# Patient Record
(demographics unavailable — no encounter records)

---

## 2025-04-01 NOTE — ASSESSMENT
[FreeTextEntry1] : I reviewed the TTE and discussed the case with patient. Will obtain JACKIE, left and right heart cath as well as CTA chest to evaluate pulmonary veins.  Once tests are completed, will evaluate with Dr. Terence Lovelace for candidacy for transcatheter ASD repair.

## 2025-04-01 NOTE — HISTORY OF PRESENT ILLNESS
[FreeTextEntry1] : 50 year old male followed and referred by Dr. Kamaljit Gabriel for evaluation of large secundum ASD 20mm in diameter.  PMH includes HLD, recently seen by Dr. Gabriel after she was noted to have cardiac murmur and carotid bruit by her PCP.  She presents today with [facility] , reports she lived in [place] most of her life and [place] and was told she had a murmur on her physical exam. She reports she never had any symptoms but recently has noticed some palpitations at times where her heart beats fast and then goes back to normal.  This happens only when she lays on her side at night. 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 Had surgery for uterine fibroids in past.  Does not take any medications. Has a daily glass of wine at night.  Denies chest pain, SOB, swelling, weight gain, cough, fever or chills.  TTE 1/9/2025: Mild-mod TR, Mild HI, RV mod enlarged, ASD shunts from right to left.

## 2025-04-01 NOTE — HISTORY OF PRESENT ILLNESS
[FreeTextEntry1] : 50 year old male followed and referred by Dr. Kamaljit Gabriel for evaluation of large secundum ASD 20mm in diameter.  PMH includes HLD, recently seen by Dr. Gabriel after she was noted to have cardiac murmur and carotid bruit by her PCP.  She presents today with [facility] , reports she lived in [place] most of her life [place] and was told she had a murmur on her physical exam. She reports she never had any symptoms but recently has noticed some palpitations at times where her heart beats fast and then goes back to normal.  This happens only when she lays on her side at night. 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 Had surgery for uterine fibroids in past.  Does not take any medications. Has a daily glass of wine at night.  Denies chest pain, SOB, swelling, weight gain, cough, fever or chills.  TTE 1/9/2025: Mild-mod TR, Mild MN, RV mod enlarged, ASD shunts from right to left.

## 2025-04-01 NOTE — END OF VISIT
[FreeTextEntry3] : Written by Bob Nicole NP, acting as a scribe for Dr. Chairez The documentation recorded by the scribe accurately reflects the service I personally performed and the decisions made by me. Signature Jeison Chairez MD.

## 2025-04-01 NOTE — PHYSICAL EXAM
[General Appearance - Alert] : alert [General Appearance - In No Acute Distress] : in no acute distress [General Appearance - Well Nourished] : well nourished [General Appearance - Well Developed] : well developed [Sclera] : the sclera and conjunctiva were normal [] : no respiratory distress [Respiration, Rhythm And Depth] : normal respiratory rhythm and effort [Exaggerated Use Of Accessory Muscles For Inspiration] : no accessory muscle use [Auscultation Breath Sounds / Voice Sounds] : lungs were clear to auscultation bilaterally [Apical Impulse] : the apical impulse was normal [Heart Rate And Rhythm] : heart rate was normal and rhythm regular [Heart Sounds] : normal S1 and S2 [Abdomen Soft] : soft [Abdomen Tenderness] : non-tender [Involuntary Movements] : no involuntary movements were seen [No Focal Deficits] : no focal deficits [Oriented To Time, Place, And Person] : oriented to person, place, and time [Impaired Insight] : insight and judgment were intact [Affect] : the affect was normal [Mood] : the mood was normal

## 2025-04-01 NOTE — REASON FOR VISIT
[Consultation] : a consultation visit [Language Line ] : provided by Language [employer]   [Interpreters_IDNumber] : [id number] [Interpreters_FullName] : Archana [TWNoteComboBox1] : Chinese

## 2025-04-01 NOTE — REASON FOR VISIT
[Consultation] : a consultation visit [Language Line ] : provided by [employer]   [Interpreters_IDNumber] : [id number] [Interpreters_FullName] : Archana [TWNoteComboBox1] : Chinese

## 2025-05-08 NOTE — ADDENDUM
[FreeTextEntry1] : Mr. Gabriel has an ASD with significant shunting and minimal rim available for transcatheter closure. She has RV enlargement and exertional symptoms.  PA pressures and CI are thankfully preserved.  I have explained the indications for ASD closure and possible tricuspid repair. I plan to do this with a minimally invasive approach. She opts to have this done at [facility] in [place] and I will facilitate these arrangements.

## 2025-05-08 NOTE — DATA REVIEWED
[FreeTextEntry1] : I have independently reviewed the following: Cardiac catheterization on 4/10/2025: 1. 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 No angiographically apparent atherosclerotic coronary artery disease 3. Normal LVEF (>55%) 4. Normal LVEDP (5mmHg) 5. No transaortic valvular gradient 6. Right heart catheterization conclusions:      - Normal right atrial pressure (RA 8/7 (3) mmHg)      - Moderately elevated right heart filling pressure (RV 45/0/11 mmHg, PAP 42/10 (22) mmHg)      - Normal PCWP (8/8 (4) mmHg)      - Normal cardiac output and index, Rebecca CO 4.55L/min, Cl 3.15 L/min/sq m      - Room air RA sat 93%, PA sat 85%  CT Angio Heart Coronary w/IV cont on 4/25/2025: 1.  Secundum-type atrioseptal defect. 2.  No anomalous pulmonary venous return. 3.  Enlarged right atrium and right ventricle with straightening of the interventricular septum can be secondary to increased right ventricular pressure and/or volume.

## 2025-06-19 NOTE — REASON FOR VISIT
[Follow-Up: _____] : a [unfilled] follow-up visit [FreeTextEntry1] : FOLLOW YOUR HEART- Transitional [facility]- [facility]

## 2025-06-19 NOTE — PHYSICAL EXAM
[Sclera] : the sclera and conjunctiva were normal [Neck Appearance] : the appearance of the neck was normal [] : no respiratory distress [Respiration, Rhythm And Depth] : normal respiratory rhythm and effort [Exaggerated Use Of Accessory Muscles For Inspiration] : no accessory muscle use [Auscultation Breath Sounds / Voice Sounds] : lungs were clear to auscultation bilaterally [Apical Impulse] : the apical impulse was normal [Heart Rate And Rhythm] : heart rate was normal and rhythm regular [Heart Sounds] : normal S1 and S2 [Murmurs] : no murmurs [Chest Visual Inspection Thoracic Asymmetry] : no chest asymmetry [1+] : left 1+ [FreeTextEntry2] : B/L LE without edema, B/L calves soft, NT  [Abnormal Walk] : normal gait [Skin Color & Pigmentation] : normal skin color and pigmentation [Skin Turgor] : normal skin turgor [Skin Lesions] : no skin lesions [FreeTextEntry1] : at baseline  [Oriented To Time, Place, And Person] : oriented to person, place, and time [Impaired Insight] : insight and judgment were intact [Affect] : the affect was normal

## 2025-06-19 NOTE — HISTORY OF PRESENT ILLNESS
[FreeTextEntry1] : 51 yo F with PMHx HLD who was found to have an incidental murmur during annual work-up with her PCP. Further TTE showed a a large secundum ASD 20mm in diameter. Patient presented to [facility] 6/10/25 SDA underwent a mini ASD closure, TV repair with 30 mm band. No intraoperative complications or arrhythmia. Arrived to CTICU extubated on HFNC. POD 1, albumin given for low urine output with positive response. Weaned off HFNC. Transferred to the floor. Medrol dose pack started. POD 2, pleural tube removed with stable post-pull CXR. PW removed without incidence. Aldactone started. POD 3, pericardial drain removed. 3 mg Warfarin started. Post-op TTE with good valve function and proper ring placement. POD 4. Started BB. POD 5, INR is 3.0, coumadin is held. POD 6, INR 2.29. Pt remained hemodynamically stable and discharged home with support of spouse/family, home care services and the [facility] team. Initial visit completed in home. Language Line  utilized for duration of visit #[id number] CC "I'm doing ok"

## 2025-06-19 NOTE — ASSESSMENT
[FreeTextEntry1] : Pt recovering well at home s/p OHS. Reviewed all medications and dosages with pt understanding. Pt has all medications in home and is taking as prescribed. Pain controlled with current medication regimen. No new symptoms, issues or concerns to report at this time.  PLAN:  -Continue current medication regimen   -Continue Post Operative Care including:      -Cleanse all incisions DAILY with mild soap and water. Avoid lotion, powders and/or creams near or on incisions       -Daily weights- report any increase of 2 lbs or more overnight to the [facility] or CTS team       -Incentive spirometry with cough and deep breathing several times per hour      -Ambulate as much as tolerated including outdoors if weather and safety permits      -Avoid lifting anything more than 5 lbs and avoid straining      -Maintain a low sodium, low fat, heart healthy diet, including healthy sources of protein   [facility] team will continue to follow up with pt status. NP/CCC roles explained with pt understanding, contact information provided. Pt agrees to call with any questions, issues or concerns.  Worsening symptoms reviewed with patient understanding.    FOLLOW UP APPOINTMENTS: CTS: Dr. Chairez [place] office 6/24 CARDIOLOGIST: Dr. Gabriel later today at 3 PM for INR check. Rev'd importance of INR monitoring with pt understanding PCP: Pt encouraged to follow up within one month of discharge

## 2025-06-24 NOTE — ASSESSMENT
[FreeTextEntry1] : 49 yo F with PMHx HLD who was found to have an incidental murmur during annual work-up with her PCP. Further TTE showed a a large secundum ASD 20mm in diameter. Patient presented to [facility] 6/10/25 SDA underwent a mini ASD closure, TV repair with 30 mm band. No intraoperative complications or arrhythmia. Arrived to CTICU extubated on HFNC. POD 1, albumin given for low urine output with positive response. Weaned off HFNC. Transferred to the floor. Medrol dose pack started. POD 2, pleural tube removed with stable post-pull CXR. PW removed without incidence. Aldactone started. POD 3, pericardial drain removed. 3 mg Warfarin started. Post-op TTE with good valve function and proper ring placement. POD 4. Started BB. POD 5, INR is 3.0, coumadin is held. POD 6, INR 2.29. Pt remained hemodynamically stable and discharged home with support of spouse/family,   Dr. Gabriel cardiology following INR  Today she presents and reports that she has headaches and bloating. Denies any chest pain, shortness of breath, palpitations, dizziness or pedal edema. Her INR was done last thursday , she does not remember the INR level.     Today on exam bilateral lung fields are clear, no wheezing or rales, no use of accessory muscles noted or respiratory distress, normal sinus rhythm, RT thoracotomy stable, incision clean, dry and intact.   No peripheral edema noted.     Instructed patient on importance of optimal glycemic control, daily showering, daily weights, any signs of fever (temperature greater than 101F, chills,  incentive spirometer use, and increase ambulation as tolerated. Instructed to call office with any signs or symptoms of infection or weight gain of 2 or more pounds in 1 day or 3 or more pounds in 1 week.    Discussed intake of plant based foods, including vegetables, fruits, and whole grain foods: legumes, nuts and seeds, fish or seafood, lean meats, and non-fat or low-fat diary foods. Plant based oils (non-tropical) in place of solid fats. Instructed patient to limit intake of high fat meats and processed meats, high-fat diary foods, dietary cholesterol and sodium, foods and beverages with added sugars.   Plan: 1) Continue current medication regimen 2) Follow up with cardiologist and PCP 3) May return on as needed basis 4) Follow up PT/INR for Coumadin dosing adjustment (Dr. Gabriel) 5) Ambulate as tolerated  6) Continue to increase activity and walk daily as tolerated. Continue to use incentive spirometer. 7) Keep legs elevated above heart when resting/sitting/sleeping. 8) Call MD if you experience fever, fatigue, dizziness, confusion, syncope, shortness of breath, chest pain not relieved with analgesics, increased redness/drainage from the surgical  incision site

## 2025-06-24 NOTE — REASON FOR VISIT
[Family Member] : family member [Language Line ] : provided by Language Line   [Time Spent: ____ minutes] : Total time spent using  services: [unfilled] minutes. The patient's primary language is not English thus required  services. [de-identified] :  mini ASD closure, TV repair with 30 mm band.  [de-identified] : 6/10/25 [Interpreters_IDNumber] : [id number] [Interpreters_FullName] : Alina

## 2025-06-24 NOTE — REASON FOR VISIT
[Family Member] : family member [Language Line ] : provided by Language Line   [Time Spent: ____ minutes] : Total time spent using  services: [unfilled] minutes. The patient's primary language is not English thus required  services. [de-identified] :  mini ASD closure, TV repair with 30 mm band.  [de-identified] : 6/10/25 [Interpreters_IDNumber] : [id number] [Interpreters_FullName] : Alina

## 2025-06-24 NOTE — ASSESSMENT
[FreeTextEntry1] : 49 yo F with PMHx HLD who was found to have an incidental murmur during annual work-up with her PCP. Further TTE showed a a large secundum ASD 20mm in diameter. Patient presented to [facility] 6/10/25 SDA underwent a mini ASD closure, TV repair with 30 mm band. No intraoperative complications or arrhythmia. Arrived to CTICU extubated on HFNC. POD 1, albumin given for low urine output with positive response. Weaned off HFNC. Transferred to the floor. Medrol dose pack started. POD 2, pleural tube removed with stable post-pull CXR. PW removed without incidence. Aldactone started. POD 3, pericardial drain removed. 3 mg Warfarin started. Post-op TTE with good valve function and proper ring placement. POD 4. Started BB. POD 5, INR is 3.0, coumadin is held. POD 6, INR 2.29. Pt remained hemodynamically stable and discharged home with support of spouse/family,   Dr. Gabriel cardiology following INR  Today she presents and reports that she has headaches and bloating. Denies any chest pain, shortness of breath, palpitations, dizziness or pedal edema. Her INR was done last thursday , she does not remember the INR level.     Today on exam bilateral lung fields are clear, no wheezing or rales, no use of accessory muscles noted or respiratory distress, normal sinus rhythm, RT thoracotomy stable, incision clean, dry and intact.   No peripheral edema noted.     Instructed patient on importance of optimal glycemic control, daily showering, daily weights, any signs of fever (temperature greater than 101F, chills,  incentive spirometer use, and increase ambulation as tolerated. Instructed to call office with any signs or symptoms of infection or weight gain of 2 or more pounds in 1 day or 3 or more pounds in 1 week.    Discussed intake of plant based foods, including vegetables, fruits, and whole grain foods: legumes, nuts and seeds, fish or seafood, lean meats, and non-fat or low-fat diary foods. Plant based oils (non-tropical) in place of solid fats. Instructed patient to limit intake of high fat meats and processed meats, high-fat diary foods, dietary cholesterol and sodium, foods and beverages with added sugars.   Plan: 1) Continue current medication regimen 2) Follow up with cardiologist and PCP 3) May return on as needed basis 4) Follow up PT/INR for Coumadin dosing adjustment (Dr. Gabriel) 5) Ambulate as tolerated  6) Continue to increase activity and walk daily as tolerated. Continue to use incentive spirometer. 7) Keep legs elevated above heart when resting/sitting/sleeping. 8) Call MD if you experience fever, fatigue, dizziness, confusion, syncope, shortness of breath, chest pain not relieved with analgesics, increased redness/drainage from the surgical  incision site

## 2025-06-24 NOTE — REASON FOR VISIT
[Family Member] : family member [Language Line ] : provided by Language Line   [Time Spent: ____ minutes] : Total time spent using  services: [unfilled] minutes. The patient's primary language is not English thus required  services. [de-identified] :  mini ASD closure, TV repair with 30 mm band.  [de-identified] : 6/10/25 [Interpreters_IDNumber] : [id number] [Interpreters_FullName] : Alina

## 2025-06-24 NOTE — END OF VISIT
[FreeTextEntry3] :  I, Dr. MARADIAGA, JOSE ALBERTO BAE , personally performed the evaluation and management (E/M) services for this established  patient. That E/M includes conducting the initial examination, assessing all conditions, and establishing the plan of care. Today, ALBERTO OLIVEROS  was here to observe my evaluation and management services for this patient.    Absent or reduced

## 2025-06-24 NOTE — END OF VISIT
[FreeTextEntry3] :  I, Dr. MARADIAGA, JOSE ALBERTO BAE , personally performed the evaluation and management (E/M) services for this established  patient. That E/M includes conducting the initial examination, assessing all conditions, and establishing the plan of care. Today, ALBERTO OLIVEROS  was here to observe my evaluation and management services for this patient.

## 2025-06-24 NOTE — ASSESSMENT
[FreeTextEntry1] : 49 yo F with PMHx HLD who was found to have an incidental murmur during annual work-up with her PCP. Further TTE showed a a large secundum ASD 20mm in diameter. Patient presented to [facility] 6/10/25 SDA underwent a mini ASD closure, TV repair with 30 mm band. No intraoperative complications or arrhythmia. Arrived to CTICU extubated on HFNC. POD 1, albumin given for low urine output with positive response. Weaned off HFNC. Transferred to the floor. Medrol dose pack started. POD 2, pleural tube removed with stable post-pull CXR. PW removed without incidence. Aldactone started. POD 3, pericardial drain removed. 3 mg Warfarin started. Post-op TTE with good valve function and proper ring placement. POD 4. Started BB. POD 5, INR is 3.0, coumadin is held. POD 6, INR 2.29. Pt remained hemodynamically stable and discharged home with support of spouse/family,   Dr. Gabriel cardiology following INR  Today she presents and reports that she has headaches and bloating. Denies any chest pain, shortness of breath, palpitations, dizziness or pedal edema. Her INR was done last thursday , she does not remember the INR level.     Today on exam bilateral lung fields are clear, no wheezing or rales, no use of accessory muscles noted or respiratory distress, normal sinus rhythm, RT thoracotomy stable, incision clean, dry and intact.   No peripheral edema noted.     Instructed patient on importance of optimal glycemic control, daily showering, daily weights, any signs of fever (temperature greater than 101F, chills,  incentive spirometer use, and increase ambulation as tolerated. Instructed to call office with any signs or symptoms of infection or weight gain of 2 or more pounds in 1 day or 3 or more pounds in 1 week.    Discussed intake of plant based foods, including vegetables, fruits, and whole grain foods: legumes, nuts and seeds, fish or seafood, lean meats, and non-fat or low-fat diary foods. Plant based oils (non-tropical) in place of solid fats. Instructed patient to limit intake of high fat meats and processed meats, high-fat diary foods, dietary cholesterol and sodium, foods and beverages with added sugars.   Plan: 1) Continue current medication regimen 2) Follow up with cardiologist and PCP 3) May return on as needed basis 4) Follow up PT/INR for Coumadin dosing adjustment (Dr. Gabriel) 5) Ambulate as tolerated  6) Continue to increase activity and walk daily as tolerated. Continue to use incentive spirometer. 7) Keep legs elevated above heart when resting/sitting/sleeping. 8) Call MD if you experience fever, fatigue, dizziness, confusion, syncope, shortness of breath, chest pain not relieved with analgesics, increased redness/drainage from the surgical  incision site

## 2025-06-24 NOTE — PHYSICAL EXAM
[] : no respiratory distress [Respiration, Rhythm And Depth] : normal respiratory rhythm and effort [Auscultation Breath Sounds / Voice Sounds] : lungs were clear to auscultation bilaterally [Apical Impulse] : the apical impulse was normal [Heart Rate And Rhythm] : heart rate was normal and rhythm regular [Heart Sounds] : normal S1 and S2 [Murmurs] : no murmurs [Clean] : clean [Dry] : dry [Healing Well] : healing well [No Edema] : no edema [FreeTextEntry2] : RT thoracotomy